# Patient Record
Sex: FEMALE | Race: WHITE | HISPANIC OR LATINO | URBAN - METROPOLITAN AREA
[De-identification: names, ages, dates, MRNs, and addresses within clinical notes are randomized per-mention and may not be internally consistent; named-entity substitution may affect disease eponyms.]

---

## 2017-11-07 ENCOUNTER — INPATIENT (INPATIENT)
Facility: HOSPITAL | Age: 27
LOS: 1 days | Discharge: HOME | DRG: 440 | End: 2017-11-09
Attending: INTERNAL MEDICINE | Admitting: INTERNAL MEDICINE
Payer: COMMERCIAL

## 2017-11-07 VITALS
HEART RATE: 60 BPM | WEIGHT: 145.06 LBS | HEIGHT: 67 IN | SYSTOLIC BLOOD PRESSURE: 123 MMHG | RESPIRATION RATE: 16 BRPM | DIASTOLIC BLOOD PRESSURE: 72 MMHG | TEMPERATURE: 98 F | OXYGEN SATURATION: 100 %

## 2017-11-07 DIAGNOSIS — K85.90 ACUTE PANCREATITIS WITHOUT NECROSIS OR INFECTION, UNSPECIFIED: ICD-10-CM

## 2017-11-07 DIAGNOSIS — B35.1 TINEA UNGUIUM: ICD-10-CM

## 2017-11-07 DIAGNOSIS — Z29.9 ENCOUNTER FOR PROPHYLACTIC MEASURES, UNSPECIFIED: ICD-10-CM

## 2017-11-07 LAB
ALBUMIN SERPL ELPH-MCNC: 3.7 G/DL — SIGNIFICANT CHANGE UP (ref 3.5–5)
ALP SERPL-CCNC: 58 U/L — SIGNIFICANT CHANGE UP (ref 40–120)
ALT FLD-CCNC: 16 U/L DA — SIGNIFICANT CHANGE UP (ref 10–60)
ANION GAP SERPL CALC-SCNC: 6 MMOL/L — SIGNIFICANT CHANGE UP (ref 5–17)
APTT BLD: 33 SEC — SIGNIFICANT CHANGE UP (ref 27.5–37.4)
AST SERPL-CCNC: 13 U/L — SIGNIFICANT CHANGE UP (ref 10–40)
BILIRUB SERPL-MCNC: 0.2 MG/DL — SIGNIFICANT CHANGE UP (ref 0.2–1.2)
BUN SERPL-MCNC: 13 MG/DL — SIGNIFICANT CHANGE UP (ref 7–18)
CALCIUM SERPL-MCNC: 8.6 MG/DL — SIGNIFICANT CHANGE UP (ref 8.4–10.5)
CHLORIDE SERPL-SCNC: 104 MMOL/L — SIGNIFICANT CHANGE UP (ref 96–108)
CHOLEST SERPL-MCNC: 188 MG/DL — SIGNIFICANT CHANGE UP (ref 10–199)
CO2 SERPL-SCNC: 28 MMOL/L — SIGNIFICANT CHANGE UP (ref 22–31)
CREAT SERPL-MCNC: 0.85 MG/DL — SIGNIFICANT CHANGE UP (ref 0.5–1.3)
ETHANOL SERPL-MCNC: 5 MG/DL — SIGNIFICANT CHANGE UP (ref 0–10)
GLUCOSE SERPL-MCNC: 93 MG/DL — SIGNIFICANT CHANGE UP (ref 70–99)
HBA1C BLD-MCNC: 5.2 % — SIGNIFICANT CHANGE UP (ref 4–5.6)
HCG SERPL-ACNC: <1 MIU/ML — SIGNIFICANT CHANGE UP
HCT VFR BLD CALC: 41.2 % — SIGNIFICANT CHANGE UP (ref 34.5–45)
HDLC SERPL-MCNC: 63 MG/DL — SIGNIFICANT CHANGE UP (ref 40–125)
HGB BLD-MCNC: 13.4 G/DL — SIGNIFICANT CHANGE UP (ref 11.5–15.5)
HIV 1+2 AB+HIV1 P24 AG SERPL QL IA: SIGNIFICANT CHANGE UP
INR BLD: 1.06 RATIO — SIGNIFICANT CHANGE UP (ref 0.88–1.16)
LIDOCAIN IGE QN: 6739 U/L — HIGH (ref 73–393)
LIPID PNL WITH DIRECT LDL SERPL: 85 MG/DL — SIGNIFICANT CHANGE UP
MAGNESIUM SERPL-MCNC: 1.9 MG/DL — SIGNIFICANT CHANGE UP (ref 1.6–2.6)
MCHC RBC-ENTMCNC: 30.3 PG — SIGNIFICANT CHANGE UP (ref 27–34)
MCHC RBC-ENTMCNC: 32.5 GM/DL — SIGNIFICANT CHANGE UP (ref 32–36)
MCV RBC AUTO: 93.2 FL — SIGNIFICANT CHANGE UP (ref 80–100)
PHOSPHATE SERPL-MCNC: 3.1 MG/DL — SIGNIFICANT CHANGE UP (ref 2.5–4.5)
PLATELET # BLD AUTO: 226 K/UL — SIGNIFICANT CHANGE UP (ref 150–400)
POTASSIUM SERPL-MCNC: 4.5 MMOL/L — SIGNIFICANT CHANGE UP (ref 3.5–5.3)
POTASSIUM SERPL-SCNC: 4.5 MMOL/L — SIGNIFICANT CHANGE UP (ref 3.5–5.3)
PROT SERPL-MCNC: 7.5 G/DL — SIGNIFICANT CHANGE UP (ref 6–8.3)
PROTHROM AB SERPL-ACNC: 11.6 SEC — SIGNIFICANT CHANGE UP (ref 9.8–12.7)
RBC # BLD: 4.43 M/UL — SIGNIFICANT CHANGE UP (ref 3.8–5.2)
RBC # FLD: 12.3 % — SIGNIFICANT CHANGE UP (ref 10.3–14.5)
SODIUM SERPL-SCNC: 138 MMOL/L — SIGNIFICANT CHANGE UP (ref 135–145)
TOTAL CHOLESTEROL/HDL RATIO MEASUREMENT: 3 RATIO — LOW (ref 3.3–7.1)
TRIGL SERPL-MCNC: 201 MG/DL — HIGH (ref 10–149)
TSH SERPL-MCNC: 2.98 UU/ML — SIGNIFICANT CHANGE UP (ref 0.34–4.82)
WBC # BLD: 8.6 K/UL — SIGNIFICANT CHANGE UP (ref 3.8–10.5)
WBC # FLD AUTO: 8.6 K/UL — SIGNIFICANT CHANGE UP (ref 3.8–10.5)

## 2017-11-07 PROCEDURE — 74177 CT ABD & PELVIS W/CONTRAST: CPT | Mod: 26

## 2017-11-07 PROCEDURE — 76705 ECHO EXAM OF ABDOMEN: CPT | Mod: 26

## 2017-11-07 PROCEDURE — 71010: CPT | Mod: 26

## 2017-11-07 PROCEDURE — 99285 EMERGENCY DEPT VISIT HI MDM: CPT | Mod: 25

## 2017-11-07 RX ORDER — FAMOTIDINE 10 MG/ML
20 INJECTION INTRAVENOUS ONCE
Qty: 0 | Refills: 0 | Status: COMPLETED | OUTPATIENT
Start: 2017-11-07 | End: 2017-11-07

## 2017-11-07 RX ORDER — SODIUM CHLORIDE 9 MG/ML
1000 INJECTION INTRAMUSCULAR; INTRAVENOUS; SUBCUTANEOUS ONCE
Qty: 0 | Refills: 0 | Status: COMPLETED | OUTPATIENT
Start: 2017-11-07 | End: 2017-11-07

## 2017-11-07 RX ORDER — INFLUENZA VIRUS VACCINE 15; 15; 15; 15 UG/.5ML; UG/.5ML; UG/.5ML; UG/.5ML
0.5 SUSPENSION INTRAMUSCULAR ONCE
Qty: 0 | Refills: 0 | Status: COMPLETED | OUTPATIENT
Start: 2017-11-07 | End: 2017-11-07

## 2017-11-07 RX ORDER — SODIUM CHLORIDE 9 MG/ML
1000 INJECTION, SOLUTION INTRAVENOUS
Qty: 0 | Refills: 0 | Status: DISCONTINUED | OUTPATIENT
Start: 2017-11-07 | End: 2017-11-08

## 2017-11-07 RX ORDER — MORPHINE SULFATE 50 MG/1
0.5 CAPSULE, EXTENDED RELEASE ORAL EVERY 6 HOURS
Qty: 0 | Refills: 0 | Status: DISCONTINUED | OUTPATIENT
Start: 2017-11-07 | End: 2017-11-08

## 2017-11-07 RX ORDER — MORPHINE SULFATE 50 MG/1
4 CAPSULE, EXTENDED RELEASE ORAL ONCE
Qty: 0 | Refills: 0 | Status: DISCONTINUED | OUTPATIENT
Start: 2017-11-07 | End: 2017-11-07

## 2017-11-07 RX ORDER — SODIUM CHLORIDE 9 MG/ML
1000 INJECTION, SOLUTION INTRAVENOUS
Qty: 0 | Refills: 0 | Status: DISCONTINUED | OUTPATIENT
Start: 2017-11-09 | End: 2017-11-09

## 2017-11-07 RX ADMIN — SODIUM CHLORIDE 150 MILLILITER(S): 9 INJECTION, SOLUTION INTRAVENOUS at 12:22

## 2017-11-07 RX ADMIN — FAMOTIDINE 20 MILLIGRAM(S): 10 INJECTION INTRAVENOUS at 01:34

## 2017-11-07 RX ADMIN — SODIUM CHLORIDE 1000 MILLILITER(S): 9 INJECTION INTRAMUSCULAR; INTRAVENOUS; SUBCUTANEOUS at 05:49

## 2017-11-07 RX ADMIN — SODIUM CHLORIDE 2000 MILLILITER(S): 9 INJECTION INTRAMUSCULAR; INTRAVENOUS; SUBCUTANEOUS at 08:19

## 2017-11-07 RX ADMIN — SODIUM CHLORIDE 100 MILLILITER(S): 9 INJECTION, SOLUTION INTRAVENOUS at 09:04

## 2017-11-07 RX ADMIN — Medication 30 MILLILITER(S): at 01:35

## 2017-11-07 RX ADMIN — MORPHINE SULFATE 4 MILLIGRAM(S): 50 CAPSULE, EXTENDED RELEASE ORAL at 03:01

## 2017-11-07 RX ADMIN — MORPHINE SULFATE 4 MILLIGRAM(S): 50 CAPSULE, EXTENDED RELEASE ORAL at 02:31

## 2017-11-07 NOTE — PROGRESS NOTE ADULT - ASSESSMENT
27F p/w mild acute pancreatitis of unclear etiology.  -no GS or EtOH  -diflucan unknown to cause acute pancreatitis  -possible cause is herbal supplements, which the pt should stop taking  -NPO for now  -LR @ 150-200 cc/hr  -can start clear liquid diet tomorrow AM if pt tolerates it and advance as tolerated

## 2017-11-07 NOTE — H&P ADULT - PROBLEM SELECTOR PLAN 2
-IMPROVE score 0. not starting chemical DVT prophylaxis -Unsure the reason for pancreatitis, so will hold home med fluconazole and natural supplement for possible drug induced pancreatitis.

## 2017-11-07 NOTE — H&P ADULT - NSHPLABSRESULTS_GEN_ALL_CORE
LABS:                        13.4   8.6   )-----------( 226      ( 07 Nov 2017 01:44 )             41.2     11-07    138  |  104  |  13  ----------------------------<  93  4.5   |  28  |  0.85    Ca    8.6      07 Nov 2017 01:44    TPro  7.5  /  Alb  3.7  /  TBili  0.2  /  DBili  x   /  AST  13  /  ALT  16  /  AlkPhos  58  11-07    PT/INR - ( 07 Nov 2017 03:48 )   PT: 11.6 sec;   INR: 1.06 ratio         PTT - ( 07 Nov 2017 03:48 )  PTT:33.0 sec    CAPILLARY BLOOD GLUCOSE        LIVER FUNCTIONS - ( 07 Nov 2017 01:44 )  Alb: 3.7 g/dL / Pro: 7.5 g/dL / ALK PHOS: 58 U/L / ALT: 16 U/L DA / AST: 13 U/L / GGT: x    Lipase, Serum (11.07.17 @ 01:44)    Lipase, Serum: 6739 U/L      < from: CT Abdomen and Pelvis w/ Oral Cont and w/ IV Cont (11.07.17 @ 04:23) >    FINDINGS:    LOWER CHEST: Within normal limits.    LIVER: Within normal limits.  BILE DUCTS: Normal caliber.  GALLBLADDER: No radiopaque cholelithiasis.  SPLEEN: Within normal limits.  PANCREAS: Normal attenuation and morphology of the pancreas.  ADRENALS: Within normal limits.  KIDNEYS/URETERS:Within normal limits.    BLADDER: Within normal limits.  REPRODUCTIVE ORGANS: Uterus and adnexa are within normal limits.     BOWEL: No bowel obstruction. Appendix normal.  PERITONEUM: No ascites.  VESSELS:  Within normal limits.  RETROPERITONEUM: Nolymphadenopathy.    ABDOMINAL WALL: Within normal limits.  BONES: Within normal limits.    IMPRESSION: No CT evidence for sequela of acute pancreatitis.    < end of copied text > LABS:                        13.4   8.6   )-----------( 226      ( 07 Nov 2017 01:44 )             41.2     11-07    138  |  104  |  13  ----------------------------<  93  4.5   |  28  |  0.85    Ca    8.6      07 Nov 2017 01:44    TPro  7.5  /  Alb  3.7  /  TBili  0.2  /  DBili  x   /  AST  13  /  ALT  16  /  AlkPhos  58  11-07    PT/INR - ( 07 Nov 2017 03:48 )   PT: 11.6 sec;   INR: 1.06 ratio         PTT - ( 07 Nov 2017 03:48 )  PTT:33.0 sec    CAPILLARY BLOOD GLUCOSE        LIVER FUNCTIONS - ( 07 Nov 2017 01:44 )  Alb: 3.7 g/dL / Pro: 7.5 g/dL / ALK PHOS: 58 U/L / ALT: 16 U/L DA / AST: 13 U/L / GGT: x    Lipase, Serum (11.07.17 @ 01:44)    Lipase, Serum: 6739 U/L      < from: CT Abdomen and Pelvis w/ Oral Cont and w/ IV Cont (11.07.17 @ 04:23) >    FINDINGS:    LOWER CHEST: Within normal limits.    LIVER: Within normal limits.  BILE DUCTS: Normal caliber.  GALLBLADDER: No radiopaque cholelithiasis.  SPLEEN: Within normal limits.  PANCREAS: Normal attenuation and morphology of the pancreas.  ADRENALS: Within normal limits.  KIDNEYS/URETERS:Within normal limits.    BLADDER: Within normal limits.  REPRODUCTIVE ORGANS: Uterus and adnexa are within normal limits.     BOWEL: No bowel obstruction. Appendix normal.  PERITONEUM: No ascites.  VESSELS:  Within normal limits.  RETROPERITONEUM: Nolymphadenopathy.    ABDOMINAL WALL: Within normal limits.  BONES: Within normal limits.    IMPRESSION: No CT evidence for sequela of acute pancreatitis.    < end of copied text >  < from: US Hepatic & Pancreatic (11.07.17 @ 10:24) >    IMPRESSION:   No sonographic evidence of cholelithiasis or acute cholecystitis. No   biliary ductal dilatation.     Visualized pancreatic head is sonographically unremarkable.    < end of copied text >

## 2017-11-07 NOTE — ED PROVIDER NOTE - MEDICAL DECISION MAKING DETAILS
28 y/o F presenting w/ epigastric and RUQ abd pain. Will send labs, treat pain, and re-assess. Pt found to have elevated lipase, so pt will have a CT-scan and then likely be admitted.

## 2017-11-07 NOTE — H&P ADULT - FAMILY HISTORY
Grandparent  Still living? Unknown  Family history of hyperlipidemia, Age at diagnosis: Age Unknown  Family history of stomach cancer, Age at diagnosis: Age Unknown

## 2017-11-07 NOTE — H&P ADULT - ASSESSMENT
Patient is a 27y old  Female who presents with a chief complaint of RUQ abdominal pain, is admitted to the medicine floor for acute pancreatitis given elevated lipase of 6739.

## 2017-11-07 NOTE — H&P ADULT - PROBLEM SELECTOR PLAN 1
-RUQ pain, elevated Lipase; acute pancreatitis but lacks CT evidence of pancreatitis,  likely early stage.  -BISAP score 0, <1% risk of mortality  -Check hepatic and pancreas sono for possible radiopaque gall stone.  -Triglyceride 201, not significantly high.  -Check blood alcohol level  -NPO, advance diet as tolerated, IV hydration  -Pain control PRN -RUQ pain, elevated Lipase; acute pancreatitis but lacks CT evidence of pancreatitis,  likely early stage.  -BISAP score 0, <1% risk of mortality  -Check hepatic and pancreas sono for possible radiopaque gall stone.  -Triglyceride 201, not significantly high.  -No sonographic evidence of gall stones.  -NPO, advance diet as tolerated, IV hydration  -Pain control PRN  -Unsure the reason for pancreatitis given benign TG level, no EtOH history, and negative gall stones, so will hold home med fluconazole and natural supplement for possible drug induced pancreatitis. -RUQ pain, elevated Lipase; acute pancreatitis but lacks CT evidence of pancreatitis,  likely early stage.  -BISAP score 0, <1% risk of mortality  -Check hepatic and pancreas sono for possible radiopaque gall stone.  -Triglyceride 201, not significantly high.  -No sonographic evidence of gall stones.  -NPO, advance diet as tolerated, IV hydration  -Pain control PRN  -Unsure the reason for pancreatitis given benign TG level, no EtOH history, and negative gall stones, so will hold home med fluconazole and natural supplement for possible drug induced pancreatitis.  -Dr. Devine is consulted.

## 2017-11-07 NOTE — ED ADULT NURSE NOTE - OBJECTIVE STATEMENT
Pt. is aox3 came to er accompanied by her parents c/o upper abdominal pain radiating to the back x 11 hrs. pt appears uncomfortable was seen by attending ekg in progress labs pending. Pt c/o of more pain to the upper left abdomen with light palpation

## 2017-11-07 NOTE — H&P ADULT - NSHPPHYSICALEXAM_GEN_ALL_CORE
PHYSICAL EXAM:  GENERAL: NAD, well-groomed, well-developed  HEAD:  Atraumatic, Normocephalic  EYES: EOMI, PERRLA, conjunctiva and sclera clear  ENMT: No tonsillar erythema, exudates, or enlargement; Moist mucous membranes, Good dentition, No lesions  NECK: Supple, No JVD, Normal thyroid  NERVOUS SYSTEM:  Alert & Oriented X3, Good concentration; Motor Strength 5/5 B/L upper and lower extremities; DTRs 2+ intact and symmetric  CHEST/LUNG: Clear to percussion bilaterally; No rales, rhonchi, wheezing, or rubs  HEART: Regular rate and rhythm; No murmurs, rubs, or gallops  ABDOMEN: Soft, Nontender, Nondistended; Bowel sounds present  EXTREMITIES:  2+ Peripheral Pulses bilaterally, No clubbing, cyanosis, or edema  LYMPH: No lymphadenopathy noted  SKIN: No rashes or lesions    T(C): 36.7 (11-07-17 @ 06:36), Max: 36.8 (11-07-17 @ 00:40)  HR: 48 (11-07-17 @ 06:36) (48 - 60)  BP: 92/51 (11-07-17 @ 06:36) (92/51 - 123/72)  RR: 18 (11-07-17 @ 06:36) (16 - 18)  SpO2: 100% (11-07-17 @ 06:36) (100% - 100%)  Wt(kg): --  I&O's Summary PHYSICAL EXAM:  GENERAL: NAD, well-groomed, well-developed  HEAD:  Atraumatic, Normocephalic  EYES: EOMI, PERRLA, conjunctiva and sclera clear  ENMT: No tonsillar erythema, exudates, or enlargement; Moist mucous membranes, Good dentition, No lesions  NECK: Supple, No JVD, Normal thyroid  NERVOUS SYSTEM:  Alert & Oriented X3, Good concentration; Motor Strength 5/5 B/L upper and lower extremities  CHEST/LUNG: Clear to percussion bilaterally; No rales, rhonchi, wheezing, or rubs  HEART: Regular rate and rhythm; No murmurs, rubs, or gallops  ABDOMEN: Soft, Nondistended; Bowel sounds present. Epigastric tenderness to palpation.  EXTREMITIES:  2+ Peripheral Pulses bilaterally, No clubbing, cyanosis, or edema  LYMPH: No lymphadenopathy noted  SKIN: No rashes or lesions. Right great toe light dark discoloration.    T(C): 36.7 (11-07-17 @ 06:36), Max: 36.8 (11-07-17 @ 00:40)  HR: 48 (11-07-17 @ 06:36) (48 - 60)  BP: 92/51 (11-07-17 @ 06:36) (92/51 - 123/72)  RR: 18 (11-07-17 @ 06:36) (16 - 18)  SpO2: 100% (11-07-17 @ 06:36) (100% - 100%)  Wt(kg): --  I&O's Summary

## 2017-11-07 NOTE — ED PROVIDER NOTE - OBJECTIVE STATEMENT
26 y/o F w/ no significant PMHx here w/ 11 hours of epigastric and RUQ abd pain. Pt denies any chest pain, difficulty breathing any other nausea, vomiting, diarrhea, fevers, or any other complaints. NKDA.

## 2017-11-07 NOTE — H&P ADULT - HISTORY OF PRESENT ILLNESS
27 years old female from home reporting no PMH came to ED c/o RUQ pain lasting 11 hrs. 27 years old female from home reporting no PMH other than right great toe fungal to nail came to ED c/o epigastric pain since 4:30 pm yesterday. Pain is pressure-like, radiating to the back, and is accompanied by nausea. Unsure if pain is related to food intake. Denies having chest pain, SOB, bowel habit change, fever, chills, or any other symptoms. Patient used to drink once a week, however completely stopped drinking 2 months ago. Denies smoking, or illicit drug use. She takes fluconazole 150mg once a week, daily fish oils and natural pills for liver protection which is available over the counter.    In ED, vitals stable, labs significant for lipase of 6739, triglycerides 201, CT of the abdomen did not show signs of pancreatitis and hepatic pancreatic sono negative for gall stones. Patient is admitted to the medicine floor for acute pancreatitis given typical symptom and elevation of lipase. Patient is admitted to the medicine floor for acute pancreatitis.

## 2017-11-07 NOTE — PROGRESS NOTE ADULT - SUBJECTIVE AND OBJECTIVE BOX
GI INITIAL CONSULT    HPI: 27F w/no sig PMH p/w nausea x2 wks w/o vomiting and acute-onset abd pain, was found to have acute pancreatitis. Pt is taking Diflucan 150 mg qwk for toenail fungus. No EtOH. Reports taking some sort of herbal supplements. No bleeding or weight loss.    PMH: none  PSH: none  Meds: Fish oil, PO iron, herbal supplements  SH: no EtOH or tobacco  FH: no h/o GI malignancies or pancreatic issues  ROS:  CONSTITUTIONAL: No fever, weight loss, or fatigue  EYES: No eye pain, visual disturbances, or discharge  ENT:  No difficulty hearing, tinnitus, vertigo; No sinus or throat pain  NECK: No pain or stiffness  RESPIRATORY: No cough, wheezing, chills or hemoptysis, shortness of Breath  CARDIOVASCULAR: No chest pain, palpitations, passing out, dizziness, or leg swelling  GASTROINTESTINAL: see HPI  GENITOURINARY: No dysuria, frequency, hematuria, or incontinence  NEUROLOGICAL: No headaches, memory loss, loss of strength, numbness, or tremors  SKIN: No itching, burning, rashes, or lesions   MUSCULOSKELETAL: No arthralgia, myalgia, or back pain.     Vitals: T(C): 36.6 (11-07-17 @ 15:30)  T(F): 97.8 (11-07-17 @ 15:30), Max: 98.2 (11-07-17 @ 00:40)  HR: 57 (11-07-17 @ 15:30) (48 - 60)  BP: 95/46 (11-07-17 @ 15:30) (89/47 - 123/72)    Gen: NAD  CVS: RRR; S1/S2  Chest: CTABL  Abd: S/ND/epigastric and RUQ tenderness                        13.4   8.6   )-----------( 226      ( 07 Nov 2017 01:44 )             41.2   11-07    138  |  104  |  13  ----------------------------<  93  4.5   |  28  |  0.85    Ca    8.6      07 Nov 2017 01:44  Phos  3.1     11-07  Mg     1.9     11-07    TPro  7.5  /  Alb  3.7  /  TBili  0.2  /  DBili  x   /  AST  13  /  ALT  16  /  AlkPhos  58  11-07    CT Abdomen and Pelvis w/ Oral Cont and w/ IV Cont (11.07.17 @ 04:23)  IMPRESSION: No CT evidence for sequela of acute pancreatitis.    US Hepatic & Pancreatic (11.07.17 @ 10:24)  IMPRESSION:   No sonographic evidence of cholelithiasis or acute cholecystitis. No   biliary ductal dilatation.   Visualized pancreatic head is sonographically unremarkable.

## 2017-11-08 LAB
ANION GAP SERPL CALC-SCNC: 8 MMOL/L — SIGNIFICANT CHANGE UP (ref 5–17)
BUN SERPL-MCNC: 6 MG/DL — LOW (ref 7–18)
CALCIUM SERPL-MCNC: 8.3 MG/DL — LOW (ref 8.4–10.5)
CHLORIDE SERPL-SCNC: 107 MMOL/L — SIGNIFICANT CHANGE UP (ref 96–108)
CO2 SERPL-SCNC: 25 MMOL/L — SIGNIFICANT CHANGE UP (ref 22–31)
CREAT SERPL-MCNC: 0.64 MG/DL — SIGNIFICANT CHANGE UP (ref 0.5–1.3)
GLUCOSE SERPL-MCNC: 90 MG/DL — SIGNIFICANT CHANGE UP (ref 70–99)
HAV IGM SER-ACNC: SIGNIFICANT CHANGE UP
HBV CORE IGM SER-ACNC: SIGNIFICANT CHANGE UP
HBV SURFACE AG SER-ACNC: SIGNIFICANT CHANGE UP
HCT VFR BLD CALC: 38.6 % — SIGNIFICANT CHANGE UP (ref 34.5–45)
HCV AB S/CO SERPL IA: 0.09 S/CO — SIGNIFICANT CHANGE UP
HCV AB SERPL-IMP: SIGNIFICANT CHANGE UP
HGB BLD-MCNC: 12.6 G/DL — SIGNIFICANT CHANGE UP (ref 11.5–15.5)
MAGNESIUM SERPL-MCNC: 2 MG/DL — SIGNIFICANT CHANGE UP (ref 1.6–2.6)
MCHC RBC-ENTMCNC: 31.3 PG — SIGNIFICANT CHANGE UP (ref 27–34)
MCHC RBC-ENTMCNC: 32.6 GM/DL — SIGNIFICANT CHANGE UP (ref 32–36)
MCV RBC AUTO: 96.2 FL — SIGNIFICANT CHANGE UP (ref 80–100)
PHOSPHATE SERPL-MCNC: 3.4 MG/DL — SIGNIFICANT CHANGE UP (ref 2.5–4.5)
PLATELET # BLD AUTO: 185 K/UL — SIGNIFICANT CHANGE UP (ref 150–400)
POTASSIUM SERPL-MCNC: 3.6 MMOL/L — SIGNIFICANT CHANGE UP (ref 3.5–5.3)
POTASSIUM SERPL-SCNC: 3.6 MMOL/L — SIGNIFICANT CHANGE UP (ref 3.5–5.3)
RBC # BLD: 4.02 M/UL — SIGNIFICANT CHANGE UP (ref 3.8–5.2)
RBC # FLD: 12.4 % — SIGNIFICANT CHANGE UP (ref 10.3–14.5)
SODIUM SERPL-SCNC: 140 MMOL/L — SIGNIFICANT CHANGE UP (ref 135–145)
WBC # BLD: 5.5 K/UL — SIGNIFICANT CHANGE UP (ref 3.8–10.5)
WBC # FLD AUTO: 5.5 K/UL — SIGNIFICANT CHANGE UP (ref 3.8–10.5)

## 2017-11-08 RX ORDER — IBUPROFEN 200 MG
400 TABLET ORAL EVERY 6 HOURS
Qty: 0 | Refills: 0 | Status: DISCONTINUED | OUTPATIENT
Start: 2017-11-08 | End: 2017-11-09

## 2017-11-08 RX ORDER — ACETAMINOPHEN 500 MG
650 TABLET ORAL EVERY 6 HOURS
Qty: 0 | Refills: 0 | Status: DISCONTINUED | OUTPATIENT
Start: 2017-11-08 | End: 2017-11-09

## 2017-11-08 RX ADMIN — SODIUM CHLORIDE 150 MILLILITER(S): 9 INJECTION, SOLUTION INTRAVENOUS at 01:18

## 2017-11-08 RX ADMIN — Medication 400 MILLIGRAM(S): at 21:42

## 2017-11-08 RX ADMIN — Medication 400 MILLIGRAM(S): at 22:00

## 2017-11-08 NOTE — PROGRESS NOTE ADULT - PROBLEM SELECTOR PLAN 2
-Unsure the reason for pancreatitis, so will hold home med fluconazole and natural supplement for possible drug induced pancreatitis.  HIV and hepatitis work up is negative.

## 2017-11-08 NOTE — PROGRESS NOTE ADULT - SUBJECTIVE AND OBJECTIVE BOX
Pt doing well. Tolerating clears without issue.    T(C): 37.2 (11-08-17 @ 15:28)  T(F): 99 (11-08-17 @ 15:28), Max: 99 (11-08-17 @ 15:28)  HR: 58 (11-08-17 @ 15:28) (58 - 67)  BP: 92/56 (11-08-17 @ 15:28) (86/43 - 106/55)    Gen: NAD  CVS: RRR; S1/S2  Chest: CTABL  Abd: S/NT/ND                        12.6   5.5   )-----------( 185      ( 08 Nov 2017 06:31 )             38.6   11-08    140  |  107  |  6<L>  ----------------------------<  90  3.6   |  25  |  0.64    Ca    8.3<L>      08 Nov 2017 06:31  Phos  3.4     11-08  Mg     2.0     11-08    TPro  7.5  /  Alb  3.7  /  TBili  0.2  /  DBili  x   /  AST  13  /  ALT  16  /  AlkPhos  58  11-07

## 2017-11-08 NOTE — PROGRESS NOTE ADULT - ASSESSMENT
27F w/simple acute pancreatitis.  -resolving  -would advance diet to full liquid tomorrow for breakfast and advance as tolerated subsequently  -pt will need MRI/MRCP of pancreas in a few weeks to fully evaluate its anatomy  -further care per primary team  -please reconsult prn

## 2017-11-08 NOTE — PROGRESS NOTE ADULT - SUBJECTIVE AND OBJECTIVE BOX
PGY 1 Note discussed with supervising resident and primary attending    Patient is a 27y old  Female who presents with a chief complaint of epigastric pain started yesterday at around 4:30 pm (07 Nov 2017 07:37)      INTERVAL HPI/OVERNIGHT EVENTS:  Patient was seen and examined at bed side. patient was feeling better. Pain only when she moves on her side.     MEDICATIONS  (STANDING):  influenza   Vaccine 0.5 milliLiter(s) IntraMuscular once    MEDICATIONS  (PRN):  acetaminophen   Tablet. 650 milliGRAM(s) Oral every 6 hours PRN Moderate Pain (4 - 6)  ibuprofen  Tablet 400 milliGRAM(s) Oral every 6 hours PRN severe pain      __________________________________________________  REVIEW OF SYSTEMS:    CONSTITUTIONAL: No fever,   EYES: no acute visual disturbances  NECK: No pain or stiffness  RESPIRATORY: No cough; No shortness of breath  CARDIOVASCULAR: No chest pain, no palpitations  GASTROINTESTINAL: No pain. No nausea or vomiting; No diarrhea   NEUROLOGICAL: No headache or numbness, no tremors  MUSCULOSKELETAL: No joint pain, no muscle pain  GENITOURINARY: no dysuria, no frequency, no hesitancy  PSYCHIATRY: no depression , no anxiety  ALL OTHER  ROS negative        Vital Signs Last 24 Hrs  T(C): 36.8 (08 Nov 2017 08:15), Max: 36.8 (08 Nov 2017 08:15)  T(F): 98.3 (08 Nov 2017 08:15), Max: 98.3 (08 Nov 2017 08:15)  HR: 67 (08 Nov 2017 08:15) (57 - 67)  BP: 106/55 (08 Nov 2017 08:15) (86/43 - 106/55)  BP(mean): --  RR: 16 (08 Nov 2017 08:15) (16 - 16)  SpO2: 97% (08 Nov 2017 08:15) (97% - 98%)    ________________________________________________  PHYSICAL EXAM:  GENERAL: NAD  HEENT: Normocephalic;  conjunctivae and sclerae clear; moist mucous membranes;   NECK : supple  CHEST/LUNG: Clear to auscultation bilaterally with good air entry   HEART: S1 S2  regular; no murmurs, gallops or rubs  ABDOMEN: Soft, mild tenderness Nondistended; Bowel sounds present  EXTREMITIES: no cyanosis; no edema; no calf tenderness  SKIN: warm and dry; no rash  NERVOUS SYSTEM:  Awake and alert; Oriented  to place, person and time ; no new deficits    _________________________________________________  LABS:                        12.6   5.5   )-----------( 185      ( 08 Nov 2017 06:31 )             38.6     11-08    140  |  107  |  6<L>  ----------------------------<  90  3.6   |  25  |  0.64    Ca    8.3<L>      08 Nov 2017 06:31  Phos  3.4     11-08  Mg     2.0     11-08    TPro  7.5  /  Alb  3.7  /  TBili  0.2  /  DBili  x   /  AST  13  /  ALT  16  /  AlkPhos  58  11-07    PT/INR - ( 07 Nov 2017 03:48 )   PT: 11.6 sec;   INR: 1.06 ratio         PTT - ( 07 Nov 2017 03:48 )  PTT:33.0 sec    CAPILLARY BLOOD GLUCOSE            RADIOLOGY & ADDITIONAL TESTS:    Imaging Personally Reviewed:  YES    Consultant(s) Notes Reviewed:   YES    Care Discussed with Consultants :     Plan of care was discussed with patient and /or primary care giver; all questions and concerns were addressed and care was aligned with patient's wishes.

## 2017-11-08 NOTE — PROGRESS NOTE ADULT - PROBLEM SELECTOR PLAN 1
-RUQ pain, elevated Lipase; acute pancreatitis but lacks CT evidence of pancreatitis,  likely early stage.  -BISAP score 0, <1% risk of mortality  -Check hepatic and pancreas sono for possible radiopaque gall stone.  -Triglyceride 201, not significantly high.  -No sonographic evidence of gall stones.  -NPO, advance diet as tolerated, IV hydration  -Pain control PRN with acetaminophen and motrin prn  -Unsure the reason for pancreatitis given benign TG level, no EtOH history, and negative gall stones, so will hold home med fluconazole and natural supplement for possible drug induced pancreatitis.  -Dr. Devine consult noted  Diet advanced to clear liquids.

## 2017-11-09 VITALS
RESPIRATION RATE: 18 BRPM | OXYGEN SATURATION: 97 % | HEART RATE: 68 BPM | SYSTOLIC BLOOD PRESSURE: 94 MMHG | DIASTOLIC BLOOD PRESSURE: 47 MMHG | TEMPERATURE: 98 F

## 2017-11-09 LAB
ANION GAP SERPL CALC-SCNC: 4 MMOL/L — LOW (ref 5–17)
BUN SERPL-MCNC: 5 MG/DL — LOW (ref 7–18)
CALCIUM SERPL-MCNC: 8 MG/DL — LOW (ref 8.4–10.5)
CHLORIDE SERPL-SCNC: 108 MMOL/L — SIGNIFICANT CHANGE UP (ref 96–108)
CO2 SERPL-SCNC: 27 MMOL/L — SIGNIFICANT CHANGE UP (ref 22–31)
CREAT SERPL-MCNC: 0.65 MG/DL — SIGNIFICANT CHANGE UP (ref 0.5–1.3)
GLUCOSE SERPL-MCNC: 113 MG/DL — HIGH (ref 70–99)
HCT VFR BLD CALC: 38.7 % — SIGNIFICANT CHANGE UP (ref 34.5–45)
HGB BLD-MCNC: 12.6 G/DL — SIGNIFICANT CHANGE UP (ref 11.5–15.5)
LIDOCAIN IGE QN: 299 U/L — SIGNIFICANT CHANGE UP (ref 73–393)
MCHC RBC-ENTMCNC: 30.4 PG — SIGNIFICANT CHANGE UP (ref 27–34)
MCHC RBC-ENTMCNC: 32.4 GM/DL — SIGNIFICANT CHANGE UP (ref 32–36)
MCV RBC AUTO: 93.9 FL — SIGNIFICANT CHANGE UP (ref 80–100)
PLATELET # BLD AUTO: 194 K/UL — SIGNIFICANT CHANGE UP (ref 150–400)
POTASSIUM SERPL-MCNC: 4.6 MMOL/L — SIGNIFICANT CHANGE UP (ref 3.5–5.3)
POTASSIUM SERPL-SCNC: 4.6 MMOL/L — SIGNIFICANT CHANGE UP (ref 3.5–5.3)
RBC # BLD: 4.13 M/UL — SIGNIFICANT CHANGE UP (ref 3.8–5.2)
RBC # FLD: 12.1 % — SIGNIFICANT CHANGE UP (ref 10.3–14.5)
SODIUM SERPL-SCNC: 139 MMOL/L — SIGNIFICANT CHANGE UP (ref 135–145)
WBC # BLD: 4 K/UL — SIGNIFICANT CHANGE UP (ref 3.8–10.5)
WBC # FLD AUTO: 4 K/UL — SIGNIFICANT CHANGE UP (ref 3.8–10.5)

## 2017-11-09 PROCEDURE — 80074 ACUTE HEPATITIS PANEL: CPT

## 2017-11-09 PROCEDURE — 85610 PROTHROMBIN TIME: CPT

## 2017-11-09 PROCEDURE — 85027 COMPLETE CBC AUTOMATED: CPT

## 2017-11-09 PROCEDURE — 93005 ELECTROCARDIOGRAM TRACING: CPT

## 2017-11-09 PROCEDURE — 84702 CHORIONIC GONADOTROPIN TEST: CPT

## 2017-11-09 PROCEDURE — 80053 COMPREHEN METABOLIC PANEL: CPT

## 2017-11-09 PROCEDURE — 80061 LIPID PANEL: CPT

## 2017-11-09 PROCEDURE — 87389 HIV-1 AG W/HIV-1&-2 AB AG IA: CPT

## 2017-11-09 PROCEDURE — 80048 BASIC METABOLIC PNL TOTAL CA: CPT

## 2017-11-09 PROCEDURE — 71045 X-RAY EXAM CHEST 1 VIEW: CPT

## 2017-11-09 PROCEDURE — 83735 ASSAY OF MAGNESIUM: CPT

## 2017-11-09 PROCEDURE — 74177 CT ABD & PELVIS W/CONTRAST: CPT

## 2017-11-09 PROCEDURE — 84100 ASSAY OF PHOSPHORUS: CPT

## 2017-11-09 PROCEDURE — 85730 THROMBOPLASTIN TIME PARTIAL: CPT

## 2017-11-09 PROCEDURE — 76705 ECHO EXAM OF ABDOMEN: CPT

## 2017-11-09 PROCEDURE — 99285 EMERGENCY DEPT VISIT HI MDM: CPT | Mod: 25

## 2017-11-09 PROCEDURE — 80307 DRUG TEST PRSMV CHEM ANLYZR: CPT

## 2017-11-09 PROCEDURE — 83036 HEMOGLOBIN GLYCOSYLATED A1C: CPT

## 2017-11-09 PROCEDURE — 84443 ASSAY THYROID STIM HORMONE: CPT

## 2017-11-09 PROCEDURE — 83690 ASSAY OF LIPASE: CPT

## 2017-11-09 RX ORDER — FLUCONAZOLE 150 MG/1
1 TABLET ORAL
Qty: 0 | Refills: 0 | COMMUNITY

## 2017-11-09 RX ORDER — IBUPROFEN 200 MG
1 TABLET ORAL
Qty: 0 | Refills: 0 | COMMUNITY
Start: 2017-11-09

## 2017-11-09 RX ORDER — ACETAMINOPHEN 500 MG
2 TABLET ORAL
Qty: 0 | Refills: 0 | COMMUNITY
Start: 2017-11-09

## 2017-11-09 RX ORDER — OMEGA-3 ACID ETHYL ESTERS 1 G
0 CAPSULE ORAL
Qty: 0 | Refills: 0 | COMMUNITY

## 2017-11-09 RX ORDER — INFLUENZA VIRUS VACCINE 15; 15; 15; 15 UG/.5ML; UG/.5ML; UG/.5ML; UG/.5ML
0 SUSPENSION INTRAMUSCULAR
Qty: 0 | Refills: 0 | COMMUNITY
Start: 2017-11-09

## 2017-11-09 RX ADMIN — SODIUM CHLORIDE 200 MILLILITER(S): 9 INJECTION, SOLUTION INTRAVENOUS at 10:22

## 2017-11-09 NOTE — DISCHARGE NOTE ADULT - PLAN OF CARE
treat inflammation, prevent reoccurrence. Patient was admitted for severe abdominal pain. Serum lipase was elevated. Suggested of acute pancreatitis which is inflammation of pancrease. Patient was kept nothing per mouth and Fluids were given aggressively. patient had immense improvement with intravenous fluids. Abdominal pain improved. diet was slowly advanced. patient is able to tolerate regular diet now. Patient will be discharged to home in stable condition. Patient can take tyelenol for moderate pain and Ibuprofen for severe pain as needed. donot take excessive amount of these medication. you were evaluated by gastroenterologist who recommended to follow up with GI in 2 weeks for repeat MRI/MRA of abdomen for pancreatic check. Please follow up with GI in 2 weeks. Please stop taking All sort of herbal medications you have been taking at home as these supplements have unknown side effects of pancreatitis, anaphylaxis, high blood pressure and Heart disease.

## 2017-11-09 NOTE — DISCHARGE NOTE ADULT - MEDICATION SUMMARY - MEDICATIONS TO STOP TAKING
I will STOP taking the medications listed below when I get home from the hospital:    fluconazole 150 mg oral tablet  -- 1 tab(s) by mouth once a week    Fish Oil oral capsule

## 2017-11-09 NOTE — DISCHARGE NOTE ADULT - MEDICATION SUMMARY - MEDICATIONS TO TAKE
I will START or STAY ON the medications listed below when I get home from the hospital:    Actamin 325 mg oral tablet  -- 2 tab(s) by mouth every 6 hours, As needed, Moderate Pain (4 - 6)  -- Indication: For Pain as needed medication     mg oral tablet  -- 1 tab(s) by mouth every 6 hours, As needed, severe pain  -- Indication: For Pain    influenza virus vaccine, inactivated  -- Indication: For Flu shot

## 2017-11-09 NOTE — DISCHARGE NOTE ADULT - HOSPITAL COURSE
27 years old female from home reporting no PMH other than right great toe fungal to nail came to ED c/o epigastric pain since 4:30 pm yesterday. Pain is pressure-like, radiating to the back, and is accompanied by nausea. Unsure if pain is related to food intake. Denies having chest pain, SOB, bowel habit change, fever, chills, or any other symptoms. Patient used to drink once a week, however completely stopped drinking 2 months ago. Denies smoking, or illicit drug use. She takes fluconazole 150mg once a week, daily fish oils and natural pills for liver protection which is available over the counter.    In ED, vitals stable, labs significant for lipase of 6739, triglycerides 201, CT of the abdomen did not show signs of pancreatitis and hepatic pancreatic sono negative for gall stones. Patient is admitted to the medicine floor for acute pancreatitis given typical symptom and elevation of lipase. Patient is admitted to the medicine floor for acute pancreatitis.    Patient was admitted for severe abdominal pain. Serum lipase was elevated. Suggested of acute pancreatitis which is inflammation of pancrease. Patient was kept nothing per mouth and Fluids were given aggressively.  Patient had immense improvement with intravenous fluids. Abdominal pain improved.  Diet was slowly advanced.  Patient is able to tolerate regular diet now. Patient will be discharged to home in stable condition. Patient can take tylenol for moderate pain and Ibuprofen for severe pain as needed. Patient was advised to not take excessive amount of these medication. was evaluated by gastroenterologist who recommended to follow up with GI in 2 weeks for repeat MRI/MRA of abdomen for pancreatic check. advised to  follow up with GI in 2 weeks. advised to stop taking All sort of herbal medications. have been taking at home as these supplements have unknown side effects of pancreatitis, anaphylaxis, high blood pressure and Heart disease.    Plan of care was discussed with patient. patient understand and agree with the plan. patient will be discharged to home in stable condition.

## 2017-11-09 NOTE — DISCHARGE NOTE ADULT - PATIENT PORTAL LINK FT
“You can access the Follow[employer] Patient Portal, offered by [facility], by registering with the following website: [email or web address]”

## 2017-11-09 NOTE — DISCHARGE NOTE ADULT - CARE PROVIDER_API CALL
Juan Devine), Internal Medicine  [id number] [place] [address]  Phone: [phone]  Fax: [phone]

## 2017-11-09 NOTE — DISCHARGE NOTE ADULT - CARE PLAN
Principal Discharge DX:	Pancreatitis  Goal:	treat inflammation, prevent reoccurrence.  Instructions for follow-up, activity and diet:	Patient was admitted for severe abdominal pain. Serum lipase was elevated. Suggested of acute pancreatitis which is inflammation of pancrease. Patient was kept nothing per mouth and Fluids were given aggressively. patient had immense improvement with intravenous fluids. Abdominal pain improved. diet was slowly advanced. patient is able to tolerate regular diet now. Patient will be discharged to home in stable condition. Patient can take tyelenol for moderate pain and Ibuprofen for severe pain as needed. donot take excessive amount of these medication. you were evaluated by gastroenterologist who recommended to follow up with GI in 2 weeks for repeat MRI/MRA of abdomen for pancreatic check. Please follow up with GI in 2 weeks. Please stop taking All sort of herbal medications you have been taking at home as these supplements have unknown side effects of pancreatitis, anaphylaxis, high blood pressure and Heart disease.

## 2018-04-16 NOTE — DISCHARGE NOTE ADULT - CAREGIVER RELATION TO PATIENT
Ms.Kelly KIMANI Guerrero is a 26 year old female. She is 34w4d pregnancy today. She is doing well and here today for NST due to GDM A2 and a severe obesity.     She has no complaints today.     She denies vaginal bleeding, leakage of fluid or abdominal pain.      She reports good fetal movement.      She denies headache, vision change, epigastric or right upper quadrant abdominal pain.    PE:   Visit Vitals  /70   Pulse 78   Ht 5' 9\" (1.753 m)   Wt (!) 139 kg   LMP 2017 (Exact Date)   BMI 45.25 kg/m²       General Appearance: Alert, cooperative, oriented x3, no distress, appears stated age  Lungs: respirations unlabored.   Abdomen: Soft, non-tender. Gravid.    NST: reactive   Recommendation : Continue twice-weekly  testing      A/P: 26 year old female at 34w4d pregnancy.     Patient has normal blood pressure and denies signs or symptoms of preeclampsia.    I discussed the reactive NST and recommendations with the patient.  I encouraged her to call our office with any further questions or concerns regarding today's visit.    Precautions to or call hospital including but not limited to vaginal bleeding, rupture of membrane, abdominal pain or decrease fetal movement reviewed with patient.    Precautions of preeclampsia reviewed with patient again, patient verbally understood.    All patient's questions were answered    Thank you again for allowing us to participate in the care of this pleasant patient.  As always, if we may be of any further assistance, please do not hesitate to contact us.    Ethan Chaudhry MD    2018                  
father

## 2018-07-17 NOTE — ED PROVIDER NOTE - ENMT, MLM
no
Airway patent, Nasal mucosa clear. Mouth with normal mucosa. Throat has no vesicles, no oropharyngeal exudates and uvula is midline.

## 2019-01-02 NOTE — PATIENT PROFILE ADULT. - PATIENT REPRESENTATIVE PHONE
Itching can be a side effect of medications. Advised to stop medication and call with an update in 3 or 4 days   [phone]

## 2023-10-06 NOTE — DISCHARGE NOTE ADULT - NS MD DC PLAN IMMU FLU PROVIDE INFO
10/06/23 2:58 PM     VB [employer] SmartForm used to document caregap status.     Anu Haque
Risks/benefits discussed with patient or patient surrogate

## 2024-02-07 NOTE — PATIENT PROFILE ADULT. - NS PRO OT REFERRAL QUES 1 YN
Anesthesia Volume In Cc: 0.5 Detail Level: Detailed Consent: The patient's consent was obtained including but not limited to risks of crusting, scabbing, blistering, scarring, darker or lighter pigmentary change, recurrence, incomplete removal and infection. Post-Care Instructions: I reviewed with the patient in detail post-care instructions. Patient is to wear sunprotection, and avoid picking at any of the treated lesions. Pt may apply Vaseline to crusted or scabbing areas. no

## 2024-10-15 NOTE — DISCHARGE NOTE ADULT - NSTOBACCONEVERSMOKERY/N_GEN_A
Radiation Oncology Consultation    Date of consult: October 15, 2024    Referring Physician: Jose Francisco Foster MD    Additional Physicians: Alcides Storm MD    Consulting Diagnosis:   Cancer Staging   Malignant neoplasm of upper-outer quadrant of right breast in female, estrogen receptor positive (CMD)  Staging form: Breast, AJCC 7th Edition  - Clinical stage from 9/7/2017: Stage IIB (T2, N1, M0) - Signed by Alcides Storm MD on 9/7/2017       Chief Complaint: \"Now I have that radiating pain down my right leg\"    History of Present Illness:  Ksenia Swanson please see my detailed consultation note from 9/10/2024.  In summary this is a 71-year-old white female with metastatic breast carcinoma to the bone.  She just recently completed a course of palliative radiation to the left sacrum which provided significant improvement in her radiating pain on the left.  Unfortunately over the past several days she has developed a worsening and even more severe pain in the right pelvis radiating down the right leg.  She has been on gabapentin which has not improved the pain at all.  She presents today to be evaluated for palliative radiation to the right pelvis.    Pain assessment: Per the nursing assessment    Pregnancy: Not possible    Previous radiation: To the right breast and lymphatics in 2009 followed by 30 May in 10 fractions to the left sacrum completed 10/7/2024.    Past Medical History:   Diagnosis Date    Allergic rhinitis     Anxiety     Bilateral cataracts 09/11/2014    BMI 27.0-27.9,adult     Carpal tunnel syndrome of left wrist 03/08/2018    Cellulitis of chest wall 12/10/2016    Cervical radiculopathy 09/17/2014    Colon polyps     Diverticula of colon 01/2007    Dry eyes     Dysphagia 08/07/2015    Fall     As of 12/28/23 \"one fall within the past year\"    Fracture     left foot - in 5th grade and 8th grade    Gastro-oesophageal reflux disease     Gout of left hand 03/20/2017    History of colon  resection 2015    Infiltrating ductal carcinoma of breast  (CMD)     [id number]    Infiltrating ductal carcinoma of breast, stage 2  (CMD)     [id number]    CHUCHO (mycobacterium avium-intracellulare) infection  (CMD) 10/11/2013    Detected in 2 lung nodules resected in 2011.     Malignant neoplasm of upper-outer quadrant of right breast in female, estrogen receptor positive (CMD) 2013    Infiltrating ductal carcinoma of the right breast with ductal and lobular features, stage IIB (T2 N1a M0).This was diagnosed in 2009. She underwent lumpectomy and axillary dissection in May 2009.  This was HER-2 negative, estrogen and progesterone receptor positive with 1 of 18 lymph nodes positive for metastatic disease. She completed 4 cycles of Adriamycin and Cytoxan chemotherapy on the C    Nuclear sclerosis of both eyes 2022    Osteoarthritis of multiple joints 2020    Osteoarthritis of spine 2018    Osteonecrosis of jaw  (CMD) 2023    Osteoporosis     PMB (postmenopausal bleeding) 2016    Pneumonia     PONV (postoperative nausea and vomiting)     Presence of dental bridge     \"lower\"    Secondary malignant neoplasm of bone  (CMD) 2018    Swelling in chest 2011    Thoracic compression fracture, closed 2016    Transaminitis 2019    Vertigo     Wears prescription eyeglasses          Past Surgical History:   Procedure Laterality Date    Bone biopsy  2017    T11    Breast biopsy  1970,     Breast reduction surgery      Breast surgery  1994    Milk duct removal     Cataract extraction w/  intraocular lens implant Right 2024    Dr. Ayala     section, low transverse  , , 1985    Colon surgery  2007    Colon resection    Dental surgery procedure N/A 2024    Debridement of mandible alveolus bone 1.5cm Extraction tooth #29, 27 Right buccal cheek facial artery myomucosal flap.    Echo heart transesophageal       Excis mouth mucosa/sub,complx repr N/A 01/03/2024    Debridement of mandible alveolus bone 1.5cm Extraction tooth #29, 27 Right buccal cheek facial artery myomucosal flap.    Excision benign tumor mandible intra oral osteotomy  01/03/2024    Debridement of mandible alveolus bone 1.5cm Extraction tooth #29, 27 Right buccal cheek facial artery myomucosal flap.    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 60 tablet 0    ibuprofen (MOTRIN) 400 MG tablet Take 1 tablet by mouth every 8 hours for 45 doses. 45 tablet 0    oxyCODONE, IMM REL, (ROXICODONE) 5 MG immediate release tablet Take 1-2 tablets by mouth every 4 hours as needed for Pain. Indications: Acute Pain 120 tablet 0    prochlorperazine (COMPAZINE) 10 MG tablet Take 1 tablet by mouth every 6 hours as needed for Nausea or Vomiting. 30 tablet 5    senna (Senokot) 8.6 MG tablet Take 1 tablet by mouth in the morning and 1 tablet in the evening. 100 tablet 1    polyethylene glycol (MiraLax) 17 GM/SCOOP powder Take 17 g by mouth daily as needed (Constipation not managed with Senokot). Stir and dissolve powder in any 4 to 8 ounces of beverage, then drink. 510 g 1    famotidine (PEPCID) 20 MG tablet Take 1 tablet by mouth daily. Begin taking on September 20, 2024. 60 tablet 0    Emollient (Udderly Smooth) Cream Apply to affected area(s) generously 2 to 4 times daily, or as needed. For external use only. 114 g 2    Misc. Devices (Pill Box 7 Day) Misc use as directed 2 each 0    capecitabine (XELODA) 150 MG tablet Take 2 father, and mother; Congestive Heart Failure in her brother and mother; Diabetes in her brother and mother; Glaucoma in her brother, father, and mother; Heart disease in her brother, brother, and father; Hyperlipidemia in her brother, father, and mother; Hypertension in her brother, brother, father, and mother; Myocardial Infarction in her brother; Patient is unaware of any medical problems in her son, son, and son; Stroke in her brother; Stroke/TIA in her brother.    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 [place]   Tobacco Use    Smoking status: Former     Current packs/day: 0.00     Average packs/day: 1 pack/day for 10.0 years (10.0 ttl pk-yrs)     Types: Cigarettes     Start date: 1970     Quit date: 1980     Years since quittin.8     Passive exposure: Past    Smokeless tobacco: Never   Vaping Use    Vaping status: Not on file   Substance and Sexual Activity    Alcohol use: Yes     Alcohol/week: 21.0 standard drinks of alcohol     Types: 21 Standard drinks or equivalent per week     Comment: daily    Drug use: No    Sexual activity: Not Currently     Partners: Male     Birth control/protection: Post-menopausal   Other Topics Concern    Not on file   Social History Narrative    Not on file     Social Determinants of Health     Financial Resource Strain: Low Risk  (2024)    Financial Resource Strain     Unable to Get: None   Food Insecurity: Low Risk  (2024)    Food Insecurity     Worried about Food: Never true     Food is Gone: Never true   Transportation Needs: No Transportation Needs (10/4/2024)    OASIS : Transportation     Lack of Transportation (Medical): No      9.7     No results found for: \"MAGNESIUM\"  No results found for: \"PHOSPHORUS\"  Bilirubin, Total (mg/dL)   Date Value   10/03/2024 0.7     GOT/AST (Units/L)   Date Value   10/03/2024 21     GPT/ALT (Units/L)   Date Value   10/03/2024 26     No components found for: \"TOTAL PROTEIN\"  Albumin (g/dL)   Date Value   10/03/2024 2.4 (L)     Alkaline Phosphatase (Units/L)   Date Value   10/03/2024 222 (H)       WBC (K/mcL)   Date Value   10/03/2024 6.9     HGB (g/dL)   Date Value   10/03/2024 10.8 (L)     HCT (%)   Date Value   10/03/2024 33.4 (L)     PLT (K/mcL)   Date Value   10/03/2024 326     No components found for: \"ABSOLUTE NEUTROPHIL\"    Lab Services on 10/04/2024   Component Date Value Ref Range Status    COLOR, URINALYSIS 10/04/2024 Yellow   Final    APPEARANCE, URINALYSIS 10/04/2024 Clear   Final    GLUCOSE, URINALYSIS 10/04/2024 Negative  Negative mg/dL Final    BILIRUBIN, URINALYSIS 10/04/2024 Positive (A)  Negative Final    KETONES, URINALYSIS 10/04/2024 Trace (A)  Negative mg/dL Final    SPECIFIC GRAVITY, URINALYSIS 10/04/2024 1.025  1.005 - 1.030 Final    OCCULT BLOOD, URINALYSIS 10/04/2024 Negative  Negative Final    PH, URINALYSIS 10/04/2024 5.5  5.0 - 7.0 Final    PROTEIN, URINALYSIS 10/04/2024 30 (A)  Negative mg/dL Final    UROBILINOGEN, URINALYSIS 10/04/2024 0.2  0.2, 1.0 mg/dL Final    NITRITE, URINALYSIS 10/04/2024 Negative  Negative Final    LEUKOCYTE ESTERASE, URINALYSIS 10/04/2024 Negative  Negative Final    SQUAMOUS EPITHELIAL, URINALYSIS 10/04/2024 1 to 5  None Seen, 1 to 5 /hpf Final    ERYTHROCYTES, URINALYSIS 10/04/2024 None Seen  None Seen, 1 to 2 /hpf Final    LEUKOCYTES, URINALYSIS 10/04/2024 1 to 5  None Seen, 1 to 5 /hpf Final    BACTERIA, URINALYSIS 10/04/2024 Few (A)  None  Seen /hpf Final    HYALINE CASTS, URINALYSIS 10/04/2024 1 to 5  None Seen, 1 to 5 /lpf Final   Lab Services on 10/03/2024   Component Date Value Ref Range Status    Hepatitis B Surface Antibody 10/03/2024 Positive   Final    Hepatitis B Surface Antigen 10/03/2024 Negative  Negative Final    Hepatitis B Core Antibody, IgG And* 10/03/2024 Negative  Negative Final    Hepatitis B Interpretation 10/03/2024    Final    Fasting Status 10/03/2024 0  0 - 999 Hours Final    Sodium 10/03/2024 138  135 - 145 mmol/L Final    Potassium 10/03/2024 3.9  3.4 - 5.1 mmol/L Final    Chloride 10/03/2024 101  97 - 110 mmol/L Final    Carbon Dioxide 10/03/2024 28  21 - 32 mmol/L Final    Anion Gap 10/03/2024 13  7 - 19 mmol/L Final    Glucose 10/03/2024 131 (H)  70 - 99 mg/dL Final    BUN 10/03/2024 7  6 - 20 mg/dL Final    Creatinine 10/03/2024 0.63  0.51 - 0.95 mg/dL Final    Glomerular Filtration Rate 10/03/2024 >90  >=60 Final    BUN/Cr 10/03/2024 11  7 - 25 Final    Calcium 10/03/2024 9.7  8.4 - 10.2 mg/dL Final    Bilirubin, Total 10/03/2024 0.7  0.2 - 1.0 mg/dL Final    GOT/AST 10/03/2024 21  <=37 Units/L Final    GPT/ALT 10/03/2024 26  <64 Units/L Final    Alkaline Phosphatase 10/03/2024 222 (H)  45 - 117 Units/L Final    Albumin 10/03/2024 2.4 (L)  3.4 - 5.0 g/dL Final    Protein, Total 10/03/2024 7.6  6.4 - 8.2 g/dL Final    Globulin 10/03/2024 5.2 (H)  2.0 - 4.0 g/dL Final    A/G Ratio 10/03/2024 0.5 (L)  1.0 - 2.4 Final    WBC 10/03/2024 6.9  4.2 - 11.0 K/mcL Final    RBC 10/03/2024 3.17 (L)  4.00 - 5.20 mil/mcL Final    HGB 10/03/2024 10.8 (L)  12.0 - 15.5 g/dL Final    HCT 10/03/2024 33.4 (L)  36.0 - 46.5 % Final    MCV 10/03/2024 105.4 (H)  78.0 - 100.0 fl Final    MCH 10/03/2024 34.1 (H)  26.0 - 34.0 pg Final    MCHC 10/03/2024 32.3  32.0 - 36.5 g/dL Final    RDW-CV 10/03/2024 13.4  11.0 - 15.0 % Final    RDW-SD 10/03/2024 52.8 (H)  39.0 - 50.0 fL Final    PLT 10/03/2024 326  140 - 450 K/mcL Final    Neutrophil, Percent  Jose Francisco Foster MD  [facility]      No